# Patient Record
Sex: FEMALE | Race: WHITE | URBAN - NONMETROPOLITAN AREA
[De-identification: names, ages, dates, MRNs, and addresses within clinical notes are randomized per-mention and may not be internally consistent; named-entity substitution may affect disease eponyms.]

---

## 2022-06-09 ENCOUNTER — OUT OF OFFICE VISIT (OUTPATIENT)
Dept: URBAN - NONMETROPOLITAN AREA MEDICAL CENTER 3 | Facility: MEDICAL CENTER | Age: 87
End: 2022-06-09
Payer: MEDICARE

## 2022-06-09 DIAGNOSIS — R10.13 ABDOMINAL DISCOMFORT, EPIGASTRIC: ICD-10-CM

## 2022-06-09 PROCEDURE — 99205 OFFICE O/P NEW HI 60 MIN: CPT | Performed by: PHYSICIAN ASSISTANT

## 2022-08-05 ENCOUNTER — OFFICE VISIT (OUTPATIENT)
Dept: URBAN - NONMETROPOLITAN AREA CLINIC 4 | Facility: CLINIC | Age: 87
End: 2022-08-05
Payer: MEDICARE

## 2022-08-05 ENCOUNTER — DASHBOARD ENCOUNTERS (OUTPATIENT)
Age: 87
End: 2022-08-05

## 2022-08-05 ENCOUNTER — WEB ENCOUNTER (OUTPATIENT)
Dept: URBAN - NONMETROPOLITAN AREA CLINIC 4 | Facility: CLINIC | Age: 87
End: 2022-08-05

## 2022-08-05 VITALS
WEIGHT: 123.4 LBS | HEART RATE: 63 BPM | HEIGHT: 65 IN | SYSTOLIC BLOOD PRESSURE: 113 MMHG | DIASTOLIC BLOOD PRESSURE: 74 MMHG | TEMPERATURE: 97.9 F | BODY MASS INDEX: 20.56 KG/M2

## 2022-08-05 DIAGNOSIS — K21.9 GASTROESOPHAGEAL REFLUX DISEASE WITHOUT ESOPHAGITIS: ICD-10-CM

## 2022-08-05 DIAGNOSIS — R10.13 EPIGASTRIC PAIN: ICD-10-CM

## 2022-08-05 PROBLEM — 266435005: Status: ACTIVE | Noted: 2022-08-05

## 2022-08-05 PROCEDURE — 99203 OFFICE O/P NEW LOW 30 MIN: CPT | Performed by: INTERNAL MEDICINE

## 2022-08-05 RX ORDER — BETAMETHASONE DIPROPIONATE 0.5 MG/G
1 APPLICATION CREAM TOPICAL ONCE A DAY
Status: DISCONTINUED | COMMUNITY

## 2022-08-05 RX ORDER — GABAPENTIN 100 MG/1
CAPSULE ORAL
Qty: 0 | Refills: 0 | Status: DISCONTINUED | COMMUNITY
Start: 1900-01-01

## 2022-08-05 RX ORDER — FAMOTIDINE 20 MG/1
1 TABLET AT BEDTIME AS NEEDED TABLET, FILM COATED ORAL ONCE A DAY
Status: ACTIVE | COMMUNITY

## 2022-08-05 RX ORDER — OMEPRAZOLE 20 MG/1
1 CAPSULE 30 MINUTES BEFORE MORNING MEAL CAPSULE, DELAYED RELEASE ORAL ONCE A DAY
Status: ACTIVE | COMMUNITY

## 2022-08-05 RX ORDER — SERTRALINE 100 MG/1
1 TABLET TABLET, FILM COATED ORAL ONCE A DAY
Status: ACTIVE | COMMUNITY

## 2022-08-05 RX ORDER — TETRACYCLINE HCL 500 MG
AS DIRECTED CAPSULE ORAL ONCE A DAY
Status: ACTIVE | COMMUNITY

## 2022-08-05 RX ORDER — CELECOXIB 200 MG/1
TAKE 1 CAPSULE (200 MG) BY ORAL ROUTE ONCE DAILY CAPSULE ORAL 1
Qty: 0 | Refills: 0 | Status: DISCONTINUED | COMMUNITY
Start: 1900-01-01

## 2022-08-05 RX ORDER — PHENOL 1.4 %
AS DIRECTED AEROSOL, SPRAY (ML) MUCOUS MEMBRANE
Status: DISCONTINUED | COMMUNITY

## 2022-08-05 RX ORDER — MECOBALAMIN 5000 MCG
AS DIRECTED LOZENGE ORAL ONCE A DAY
Status: ACTIVE | COMMUNITY

## 2022-08-05 RX ORDER — SOFT LENS ADJUNCTIVE SOLUTIONS
1 DROP INTO AFFECTED EYE  AS NEEDED DROPS OPHTHALMIC (EYE)
Status: ACTIVE | COMMUNITY

## 2022-08-05 RX ORDER — PROPYLENE GLYCOL 0.06 MG/ML
AS DIRECTED EMULSION OPHTHALMIC
Status: DISCONTINUED | COMMUNITY

## 2022-08-05 RX ORDER — ALPRAZOLAM 0.25 MG/1
1 TABLET TABLET ORAL TWICE A DAY
Status: DISCONTINUED | COMMUNITY

## 2022-08-05 RX ORDER — CALCIUM CITRATE/VITAMIN D3 200MG-6.25
AS DIRECTED TABLET ORAL ONCE A DAY
Status: ACTIVE | COMMUNITY

## 2022-08-05 RX ORDER — OLOPATADINE HYDROCHLORIDE OPHTHALMIC 1 MG/ML
1 DROP INTO AFFECTED EYE SOLUTION/ DROPS OPHTHALMIC TWICE A DAY
Status: ACTIVE | COMMUNITY

## 2022-08-05 RX ORDER — TRAZODONE HYDROCHLORIDE 100 MG/1
1 TABLET AT BEDTIME TABLET ORAL ONCE A DAY
Status: ACTIVE | COMMUNITY

## 2022-08-05 RX ORDER — VITAMIN E (DL,TOCOPHERYL ACET) 180 MG
1 TABLET CAPSULE ORAL ONCE A DAY
Status: ACTIVE | COMMUNITY

## 2022-08-05 NOTE — HPI-TODAY'S VISIT:
The patient is a 80 year old /White female , who presents on referral from Dr. Nicolasa Farnsworth for a gastroenterology evaluation of GERD. She was diagnosed with GERD in 2011. Her symptoms include heartburn and regurgitation. She was well controlled on Nexium 40 mg po daily until a week ago when her symptoms have worsened. She is having daily heartburn, mild dysphagia and coughing episodes after dinner. She attributes these symptoms to starting meclizine therapy for dizziness. She has not gained weight but takens NSAID's for arthritis. She denies odynophagia, hematemesis, melena or hematochezia. Last EGD was in 2011 (report not available). She has never had esophageal pH monitoring or barium studies.  Follow Up 4/24/2015: Patient presents for a scheduled visit. She reports partial relief of heartburn with twice daily nexium. However, the dysphagia to solids persists. She denies GI bleeding, weight loss or any chest pain.  Follow Up 5/29/2015: Patient presents for a scheduled visit. Her EGD was negative for esophageal mucosal disease/obstruction. She had a small tongue of salmon colored mucosa but biopsies were negative for Barretts. She reports mild intermittent dysphagia and night time coughing despite being on maximal dose bid therapy. No new complaints at this time. She is compliant with lifestyle modification measures.  Follow Up 11/28/2018: Patient presents for retrosternal burning, epigastric pain and dry cough. She has been off Nexium x few years and relies on H2 blockers (on demand). She has tried Peptobismol with no relief. No dysphagia, odynophagia, weight loss or change in appetite. No signs of GI bleeding.  Follow Up 8/5/22: Patient presents for epigastric pain. She was admitted to [facility] overnight , had negative cardiac testing and was seen by our inpatient service. She was discharged on Omeprazole 20 mg po BID. She has am nausea, intermittent epigastric discomfort which she manages with a H2 blocker. She has dysphagia and occasional choking episodes. She is dealing with stress related to her daughter who is an alcoholic.

## 2025-03-06 ENCOUNTER — OFFICE VISIT (OUTPATIENT)
Dept: URBAN - NONMETROPOLITAN AREA CLINIC 4 | Facility: CLINIC | Age: 89
End: 2025-03-06

## 2025-03-12 ENCOUNTER — LAB OUTSIDE AN ENCOUNTER (OUTPATIENT)
Dept: URBAN - NONMETROPOLITAN AREA CLINIC 4 | Facility: CLINIC | Age: 89
End: 2025-03-12

## 2025-03-12 ENCOUNTER — OFFICE VISIT (OUTPATIENT)
Dept: URBAN - NONMETROPOLITAN AREA CLINIC 4 | Facility: CLINIC | Age: 89
End: 2025-03-12
Payer: MEDICARE

## 2025-03-12 VITALS
WEIGHT: 123 LBS | DIASTOLIC BLOOD PRESSURE: 71 MMHG | BODY MASS INDEX: 20.49 KG/M2 | HEART RATE: 115 BPM | SYSTOLIC BLOOD PRESSURE: 135 MMHG | HEIGHT: 65 IN | TEMPERATURE: 98 F

## 2025-03-12 DIAGNOSIS — R07.9 CHEST PAIN, UNSPECIFIED TYPE: ICD-10-CM

## 2025-03-12 DIAGNOSIS — Z79.01 CHRONIC ANTICOAGULATION: ICD-10-CM

## 2025-03-12 DIAGNOSIS — R10.13 EPIGASTRIC PAIN: ICD-10-CM

## 2025-03-12 DIAGNOSIS — I48.0 PAROXYSMAL ATRIAL FIBRILLATION: ICD-10-CM

## 2025-03-12 PROBLEM — 282825002: Status: ACTIVE | Noted: 2025-03-12

## 2025-03-12 PROCEDURE — 99214 OFFICE O/P EST MOD 30 MIN: CPT | Performed by: REGISTERED NURSE

## 2025-03-12 RX ORDER — VONOPRAZAN FUMARATE 13.36 MG/1
1 TABLET TABLET ORAL ONCE A DAY
Qty: 30 | Refills: 3 | OUTPATIENT
Start: 2025-03-12 | End: 2025-07-10

## 2025-03-12 RX ORDER — TRAZODONE HYDROCHLORIDE 100 MG/1
1 TABLET AT BEDTIME TABLET ORAL ONCE A DAY
Status: ACTIVE | COMMUNITY

## 2025-03-12 RX ORDER — VITAMIN E (DL,TOCOPHERYL ACET) 180 MG
1 TABLET CAPSULE ORAL ONCE A DAY
Status: ACTIVE | COMMUNITY

## 2025-03-12 RX ORDER — MECOBALAMIN 5000 MCG
AS DIRECTED LOZENGE ORAL ONCE A DAY
Status: ACTIVE | COMMUNITY

## 2025-03-12 RX ORDER — OLOPATADINE HYDROCHLORIDE OPHTHALMIC 1 MG/ML
1 DROP INTO AFFECTED EYE SOLUTION/ DROPS OPHTHALMIC TWICE A DAY
Status: DISCONTINUED | COMMUNITY

## 2025-03-12 RX ORDER — TETRACYCLINE HCL 500 MG
AS DIRECTED CAPSULE ORAL ONCE A DAY
Status: DISCONTINUED | COMMUNITY

## 2025-03-12 RX ORDER — CALCIUM CITRATE/VITAMIN D3 200MG-6.25
AS DIRECTED TABLET ORAL ONCE A DAY
Status: ACTIVE | COMMUNITY

## 2025-03-12 RX ORDER — FAMOTIDINE 20 MG/1
1 TABLET AT BEDTIME AS NEEDED TABLET, FILM COATED ORAL ONCE A DAY
Status: ACTIVE | COMMUNITY

## 2025-03-12 RX ORDER — OMEPRAZOLE 20 MG/1
1 CAPSULE 30 MINUTES BEFORE MORNING MEAL CAPSULE, DELAYED RELEASE ORAL ONCE A DAY
Status: DISCONTINUED | COMMUNITY

## 2025-03-12 RX ORDER — SOFT LENS ADJUNCTIVE SOLUTIONS
1 DROP INTO AFFECTED EYE  AS NEEDED DROPS OPHTHALMIC (EYE)
Status: ACTIVE | COMMUNITY

## 2025-03-12 RX ORDER — SERTRALINE 100 MG/1
1 TABLET TABLET, FILM COATED ORAL ONCE A DAY
Status: ACTIVE | COMMUNITY

## 2025-03-12 NOTE — HPI-TODAY'S VISIT:
The patient is a 80 year old /White female , who presents on referral from Dr. Nicolasa Farnsworth for a gastroenterology evaluation of GERD. She was diagnosed with GERD in 2011. Her symptoms include heartburn and regurgitation. She was well controlled on Nexium 40 mg po daily until a week ago when her symptoms have worsened. She is having daily heartburn, mild dysphagia and coughing episodes after dinner. She attributes these symptoms to starting meclizine therapy for dizziness. She has not gained weight but takens NSAID's for arthritis. She denies odynophagia, hematemesis, melena or hematochezia. Last EGD was in 2011 (report not available). She has never had esophageal pH monitoring or barium studies.  Follow Up 4/24/2015: Patient presents for a scheduled visit. She reports partial relief of heartburn with twice daily nexium. However, the dysphagia to solids persists. She denies GI bleeding, weight loss or any chest pain.  Follow Up 5/29/2015: Patient presents for a scheduled visit. Her EGD was negative for esophageal mucosal disease/obstruction. She had a small tongue of salmon colored mucosa but biopsies were negative for Barretts. She reports mild intermittent dysphagia and night time coughing despite being on maximal dose bid therapy. No new complaints at this time. She is compliant with lifestyle modification measures.  Follow Up 11/28/2018: Patient presents for retrosternal burning, epigastric pain and dry cough. She has been off Nexium x few years and relies on H2 blockers (on demand). She has tried Peptobismol with no relief. No dysphagia, odynophagia, weight loss or change in appetite. No signs of GI bleeding.  Follow Up 8/5/22: Patient presents for epigastric pain. She was admitted to [facility] to [facility] overnight , had negative cardiac testing and was seen by our inpatient service. She was discharged on Omeprazole 20 mg po BID. She has am nausea, intermittent epigastric discomfort which she manages with a H2 blocker. She has dysphagia and occasional choking episodes. She is dealing with stress related to her daughter who is an alcoholic.  3/12/25: Pt RTC with c/o stomach burning that started about 3 weeks ago. She was admitted to [facility] 2/25-2/27. In the emergency room her lipase and liver enzymes were normal.  Right upper quadrant ultrasound was unremarkable, and CT showed heavily calcified but nonaneurysmal abdominal aorta and colonic diverticular disease with no evidence of diverticulitis, no acute process.  She was admitted to the floor for further evaluation and management.  General surgery was consulted but there was no indication for surgical intervention.  She was maintained on supportive care and diet was advanced slowly.  She had also complained of lower extremity claudication and arterial duplex was performed revealing normal JOCY with no significant stenosis in either extremities.  On 2/26 she underwent mesenteric study which demonstrated evidence of aorta calcification but no stenosis.  On 2/27 she was advanced to GI soft diet which she tolerated well.  She reported improved symptoms and was subsequently deemed medically stable for discharge back to her home. She went back to the ED on 3/11/25 with c/o chest pain. Age-adjusted D-dimer negative.  Troponin x 2 within normal limits.  Labs significant for stable anemia with hemoglobin of 11.5. Chest x-ray shows no evidence of pneumonia, pleural effusion or pneumothorax. EKG shows sinus rhythm with a rate of 71.  Premature supraventricular complexes.  No concern of ischemic abnormalities.  No STEMI. She plans to f/u with cardiologist, Dr. Calderon. Since discharge, she continues to c/o epigastric pain. Currently takes Protonix and famotidine daily. She has chronic back pain and will be seeing pain mngt on 3/26/25.

## 2025-03-12 NOTE — PHYSICAL EXAM CHEST:
no lesions, no deformities, no traumatic injuries, no significant scars are present, chest wall non-tender, no masses present, breathing is unlabored without accessory muscle use,normal breath sounds Family/Patient

## 2025-03-13 PROBLEM — 717846004: Status: ACTIVE | Noted: 2025-03-13

## 2025-03-13 PROBLEM — 711150003: Status: ACTIVE | Noted: 2025-03-12
